# Patient Record
Sex: FEMALE | Race: AMERICAN INDIAN OR ALASKA NATIVE | NOT HISPANIC OR LATINO | URBAN - METROPOLITAN AREA
[De-identification: names, ages, dates, MRNs, and addresses within clinical notes are randomized per-mention and may not be internally consistent; named-entity substitution may affect disease eponyms.]

---

## 2022-07-05 ENCOUNTER — WEB ENCOUNTER (OUTPATIENT)
Dept: URBAN - METROPOLITAN AREA CLINIC 113 | Facility: CLINIC | Age: 32
End: 2022-07-05

## 2022-07-05 ENCOUNTER — OFFICE VISIT (OUTPATIENT)
Dept: URBAN - METROPOLITAN AREA CLINIC 113 | Facility: CLINIC | Age: 32
End: 2022-07-05
Payer: COMMERCIAL

## 2022-07-05 VITALS
TEMPERATURE: 98 F | SYSTOLIC BLOOD PRESSURE: 115 MMHG | HEART RATE: 80 BPM | HEIGHT: 65 IN | BODY MASS INDEX: 31.65 KG/M2 | DIASTOLIC BLOOD PRESSURE: 72 MMHG | WEIGHT: 190 LBS | RESPIRATION RATE: 18 BRPM

## 2022-07-05 DIAGNOSIS — R14.0 POSTPRANDIAL BLOATING: ICD-10-CM

## 2022-07-05 DIAGNOSIS — R13.19 ESOPHAGEAL DYSPHAGIA: ICD-10-CM

## 2022-07-05 DIAGNOSIS — K62.5 BLOOD PER RECTUM: ICD-10-CM

## 2022-07-05 DIAGNOSIS — K30 INDIGESTION: ICD-10-CM

## 2022-07-05 PROCEDURE — 99204 OFFICE O/P NEW MOD 45 MIN: CPT

## 2022-07-05 RX ORDER — PANTOPRAZOLE SODIUM 40 MG/1
1 TABLET TABLET, DELAYED RELEASE ORAL ONCE A DAY
Qty: 30 | Refills: 2 | OUTPATIENT

## 2022-07-05 NOTE — HPI-TODAY'S VISIT:
31-year-old female with a history of psoriatic arthritis on Cosentyx presents for evaluation of bloody bowel movements, bloating and abdominal pain.  Labs 3/25/2022: Normal TSH, normal CMP, normal prolactin, normal lipid panel.  Patient reports intermittent multiple GI complaints ongoing for 1-2 years. She has had several episodes of food getting stuck in her chest. SHe describes it as a tightness. She had fears of choking on the last occasion. SHe is able to pass water without any issues. She is not sure if its reflux as she has never had issues with indigestion in the past. She reports postprandial bloating on occasion. She tried and failed a low FODMAP diet in the past. She was also worked for celiac disease with EGD which was negative. She was previously followed by Dr. Good. She has not seen him in four years. She has had several episodes of rectal bleeding within the last year. She states the rectal bleeding was seen dripping into the commode and worsens when drinking more ETOH than usual. SHe had an episode a few weeks ago that lasted and 3 days. SHe states the bleeding has happened maybe five times over the last year. She does have alternating bowel habits on occasion.  She is having a regular bowel movement almost every day after drinking coffee. She has not had a colonoscopy. No family history of colon cancer or other GI malignancy.

## 2022-09-06 ENCOUNTER — OFFICE VISIT (OUTPATIENT)
Dept: URBAN - METROPOLITAN AREA CLINIC 113 | Facility: CLINIC | Age: 32
End: 2022-09-06
Payer: COMMERCIAL

## 2022-09-06 ENCOUNTER — DASHBOARD ENCOUNTERS (OUTPATIENT)
Age: 32
End: 2022-09-06

## 2022-09-06 VITALS
HEIGHT: 65 IN | TEMPERATURE: 97.7 F | RESPIRATION RATE: 16 BRPM | WEIGHT: 198 LBS | SYSTOLIC BLOOD PRESSURE: 113 MMHG | DIASTOLIC BLOOD PRESSURE: 69 MMHG | BODY MASS INDEX: 32.99 KG/M2 | HEART RATE: 57 BPM

## 2022-09-06 DIAGNOSIS — K62.5 BLOOD PER RECTUM: ICD-10-CM

## 2022-09-06 DIAGNOSIS — R14.0 POSTPRANDIAL BLOATING: ICD-10-CM

## 2022-09-06 DIAGNOSIS — R13.19 ESOPHAGEAL DYSPHAGIA: ICD-10-CM

## 2022-09-06 DIAGNOSIS — K30 INDIGESTION: ICD-10-CM

## 2022-09-06 PROBLEM — 162031009: Status: ACTIVE | Noted: 2022-07-05

## 2022-09-06 PROCEDURE — 99213 OFFICE O/P EST LOW 20 MIN: CPT

## 2022-09-06 RX ORDER — PANTOPRAZOLE SODIUM 40 MG/1
1 TABLET TABLET, DELAYED RELEASE ORAL ONCE A DAY
Qty: 90 TABLET | Refills: 3

## 2022-09-06 RX ORDER — PANTOPRAZOLE SODIUM 40 MG/1
1 TABLET TABLET, DELAYED RELEASE ORAL ONCE A DAY
Qty: 30 | Refills: 2 | Status: ACTIVE | COMMUNITY

## 2022-09-06 NOTE — HPI-TODAY'S VISIT:
31 year old female presents for follow up. She was last seen on 7/5/2022. She reported several episodes of blood per rectum occurring over the span of one year. Suspect benign anorectal origin given history of alternating bowel habits. We discussed conservative vs procedural management. Patient declined VIRIDIANA and colonoscopy at this time and wishes to trial a daily fiber. Recommended starting 1 tbsp of Benefiber daily. Patient also reported intermittent esophageal dysphagia described as chest tightness and food become stuck at the chest level while eating. Possibly acid reflux. Last EGD with Dr. Good four years prior was unremarkable. Patient was trialed on a PPI x 8 weeks. If no improvement, we would consider barium swallow or repeat EGD.   Patient states her symptoms have resolved. She started Benefiber 1 tbsp once daily and rectal bleeding has resolved. She feels better overall on the fiber. Her dysphagia has resolved on Pantoprazole 40 mg once daily. He notices her symptoms recur if she misses a day. She is overall very content with her regimen.   7/5/22: 31-year-old female with a history of psoriatic arthritis on Cosentyx presents for evaluation of bloody bowel movements, bloating and abdominal pain.  Labs 3/25/2022: Normal TSH, normal CMP, normal prolactin, normal lipid panel.  Patient reports intermittent multiple GI complaints ongoing for 1-2 years. She has had several episodes of food getting stuck in her chest. SHe describes it as a tightness. She had fears of choking on the last occasion. SHe is able to pass water without any issues. She is not sure if its reflux as she has never had issues with indigestion in the past. She reports postprandial bloating on occasion. She tried and failed a low FODMAP diet in the past. She was also worked for celiac disease with EGD which was negative. She was previously followed by Dr. Good. She has not seen him in four years. She has had several episodes of rectal bleeding within the last year. She states the rectal bleeding was seen dripping into the commode and worsens when drinking more ETOH than usual. SHe had an episode a few weeks ago that lasted and 3 days. SHe states the bleeding has happened maybe five times over the last year. She does have alternating bowel habits on occasion.  She is having a regular bowel movement almost every day after drinking coffee. She has not had a colonoscopy. No family history of colon cancer or other GI malignancy.